# Patient Record
Sex: FEMALE | Race: BLACK OR AFRICAN AMERICAN | NOT HISPANIC OR LATINO | URBAN - METROPOLITAN AREA
[De-identification: names, ages, dates, MRNs, and addresses within clinical notes are randomized per-mention and may not be internally consistent; named-entity substitution may affect disease eponyms.]

---

## 2020-06-03 ENCOUNTER — OFFICE VISIT (OUTPATIENT)
Dept: URBAN - METROPOLITAN AREA SURGERY CENTER 20 | Facility: SURGERY CENTER | Age: 40
End: 2020-06-03

## 2020-06-04 ENCOUNTER — OFFICE VISIT (OUTPATIENT)
Dept: URBAN - METROPOLITAN AREA SURGERY CENTER 16 | Facility: SURGERY CENTER | Age: 40
End: 2020-06-04

## 2020-06-17 ENCOUNTER — OFFICE VISIT (OUTPATIENT)
Dept: URBAN - METROPOLITAN AREA SURGERY CENTER 20 | Facility: SURGERY CENTER | Age: 40
End: 2020-06-17
Payer: COMMERCIAL

## 2020-06-17 DIAGNOSIS — R19.5 ABNORMAL FECES: ICD-10-CM

## 2020-06-17 DIAGNOSIS — R10.31 ABDOMINAL DISCOMFORT IN RIGHT LOWER QUADRANT: ICD-10-CM

## 2020-06-17 PROCEDURE — G8907 PT DOC NO EVENTS ON DISCHARG: HCPCS | Performed by: INTERNAL MEDICINE

## 2020-06-17 PROCEDURE — 45378 DIAGNOSTIC COLONOSCOPY: CPT | Performed by: INTERNAL MEDICINE

## 2020-06-17 PROCEDURE — G9937 DIG OR SURV COLSCO: HCPCS | Performed by: INTERNAL MEDICINE

## 2020-06-17 RX ORDER — METHIMAZOLE 5 MG/1
TAKE 1 TABLET (5 MG) BY ORAL ROUTE ONCE DAILY TABLET ORAL 1
Qty: 0 | Refills: 0 | COMMUNITY
Start: 1900-01-01

## 2020-06-17 RX ORDER — FLUTICASONE PROPIONATE 50 UG/1
SPRAY, METERED NASAL
Qty: 0 | Refills: 0 | COMMUNITY
Start: 1900-01-01

## 2020-06-17 RX ORDER — OMEPRAZOLE 20 MG/1
TAKE 1 CAPSULE BY ORAL ROUTE DAILY CAPSULE, DELAYED RELEASE ORAL 1
Qty: 60 | Refills: 1 | COMMUNITY
Start: 2020-04-22 | End: 2020-08-20

## 2020-07-30 ENCOUNTER — OFFICE VISIT (OUTPATIENT)
Dept: URBAN - METROPOLITAN AREA SURGERY CENTER 16 | Facility: SURGERY CENTER | Age: 40
End: 2020-07-30

## 2021-01-11 ENCOUNTER — OFFICE VISIT (OUTPATIENT)
Dept: URBAN - METROPOLITAN AREA CLINIC 25 | Facility: CLINIC | Age: 41
End: 2021-01-11

## 2021-09-24 ENCOUNTER — OFFICE VISIT (OUTPATIENT)
Dept: URBAN - METROPOLITAN AREA TELEHEALTH 2 | Facility: TELEHEALTH | Age: 41
End: 2021-09-24
Payer: COMMERCIAL

## 2021-09-24 VITALS — BODY MASS INDEX: 25.58 KG/M2 | HEIGHT: 62 IN | WEIGHT: 139 LBS

## 2021-09-24 DIAGNOSIS — Z90.710 HISTORY OF HYSTERECTOMY: ICD-10-CM

## 2021-09-24 DIAGNOSIS — R14.0 ABDOMINAL BLOATING: ICD-10-CM

## 2021-09-24 DIAGNOSIS — R12 HEARTBURN: ICD-10-CM

## 2021-09-24 DIAGNOSIS — E05.90 HYPERTHYROIDISM: ICD-10-CM

## 2021-09-24 PROCEDURE — 99442 PHONE E/M BY PHYS 11-20 MIN: CPT | Performed by: INTERNAL MEDICINE

## 2021-09-24 RX ORDER — METHIMAZOLE 5 MG/1
TAKE 1 TABLET (5 MG) BY ORAL ROUTE ONCE DAILY TABLET ORAL 1
Qty: 0 | Refills: 0 | COMMUNITY
Start: 1900-01-01

## 2021-09-24 RX ORDER — FLUTICASONE PROPIONATE 50 UG/1
SPRAY, METERED NASAL
Qty: 0 | Refills: 0 | COMMUNITY
Start: 1900-01-01

## 2021-10-19 PROBLEM — 34486009: Status: ACTIVE | Noted: 2021-09-24

## 2021-10-22 ENCOUNTER — OFFICE VISIT (OUTPATIENT)
Dept: URBAN - METROPOLITAN AREA SURGERY CENTER 20 | Facility: SURGERY CENTER | Age: 41
End: 2021-10-22

## 2021-12-01 ENCOUNTER — OFFICE VISIT (OUTPATIENT)
Dept: URBAN - METROPOLITAN AREA SURGERY CENTER 20 | Facility: SURGERY CENTER | Age: 41
End: 2021-12-01

## 2021-12-30 ENCOUNTER — OFFICE VISIT (OUTPATIENT)
Dept: URBAN - METROPOLITAN AREA SURGERY CENTER 20 | Facility: SURGERY CENTER | Age: 41
End: 2021-12-30

## 2022-01-14 ENCOUNTER — OFFICE VISIT (OUTPATIENT)
Dept: URBAN - METROPOLITAN AREA SURGERY CENTER 20 | Facility: SURGERY CENTER | Age: 42
End: 2022-01-14

## 2022-04-08 ENCOUNTER — CLAIMS CREATED FROM THE CLAIM WINDOW (OUTPATIENT)
Dept: URBAN - METROPOLITAN AREA CLINIC 4 | Facility: CLINIC | Age: 42
End: 2022-04-08
Payer: COMMERCIAL

## 2022-04-08 ENCOUNTER — OFFICE VISIT (OUTPATIENT)
Dept: URBAN - METROPOLITAN AREA SURGERY CENTER 20 | Facility: SURGERY CENTER | Age: 42
End: 2022-04-08
Payer: COMMERCIAL

## 2022-04-08 ENCOUNTER — WEB ENCOUNTER (OUTPATIENT)
Dept: URBAN - METROPOLITAN AREA SURGERY CENTER 20 | Facility: SURGERY CENTER | Age: 42
End: 2022-04-08

## 2022-04-08 DIAGNOSIS — K31.89 ACQUIRED DEFORMITY OF DUODENUM: ICD-10-CM

## 2022-04-08 DIAGNOSIS — K31.89 FOCAL FOVEOLAR HYPERPLASIA: ICD-10-CM

## 2022-04-08 DIAGNOSIS — R12 BURNING REFLUX: ICD-10-CM

## 2022-04-08 DIAGNOSIS — R14.0 ABDOMINAL BLOATING: ICD-10-CM

## 2022-04-08 PROCEDURE — G8907 PT DOC NO EVENTS ON DISCHARG: HCPCS | Performed by: INTERNAL MEDICINE

## 2022-04-08 PROCEDURE — 88312 SPECIAL STAINS GROUP 1: CPT | Performed by: PATHOLOGY

## 2022-04-08 PROCEDURE — 43239 EGD BIOPSY SINGLE/MULTIPLE: CPT | Performed by: INTERNAL MEDICINE

## 2022-04-08 PROCEDURE — 88305 TISSUE EXAM BY PATHOLOGIST: CPT | Performed by: PATHOLOGY

## 2022-04-08 RX ORDER — METHIMAZOLE 5 MG/1
TAKE 1 TABLET (5 MG) BY ORAL ROUTE ONCE DAILY TABLET ORAL 1
Qty: 0 | Refills: 0 | COMMUNITY
Start: 1900-01-01

## 2022-04-08 RX ORDER — FLUTICASONE PROPIONATE 50 UG/1
SPRAY, METERED NASAL
Qty: 0 | Refills: 0 | COMMUNITY
Start: 1900-01-01

## 2022-04-11 ENCOUNTER — TELEPHONE ENCOUNTER (OUTPATIENT)
Dept: URBAN - METROPOLITAN AREA CLINIC 92 | Facility: CLINIC | Age: 42
End: 2022-04-11

## 2022-04-11 RX ORDER — OMEPRAZOLE MAGNESIUM 20.6 MG/1
1 TABLET 30 MINUTES BEFORE MORNING MEAL TABLET, DELAYED RELEASE ORAL ONCE A DAY
Qty: 60 | Refills: 1 | OUTPATIENT
Start: 2022-04-11

## 2022-04-30 ENCOUNTER — TELEPHONE ENCOUNTER (OUTPATIENT)
Dept: URBAN - METROPOLITAN AREA CLINIC 121 | Facility: CLINIC | Age: 42
End: 2022-04-30

## 2022-05-01 ENCOUNTER — TELEPHONE ENCOUNTER (OUTPATIENT)
Dept: URBAN - METROPOLITAN AREA CLINIC 121 | Facility: CLINIC | Age: 42
End: 2022-05-01

## 2023-01-04 ENCOUNTER — TELEPHONE ENCOUNTER (OUTPATIENT)
Dept: URBAN - METROPOLITAN AREA CLINIC 84 | Facility: CLINIC | Age: 43
End: 2023-01-04

## 2023-01-23 ENCOUNTER — OFFICE VISIT (OUTPATIENT)
Dept: URBAN - METROPOLITAN AREA CLINIC 25 | Facility: CLINIC | Age: 43
End: 2023-01-23

## 2023-02-27 ENCOUNTER — OFFICE VISIT (OUTPATIENT)
Dept: URBAN - METROPOLITAN AREA CLINIC 25 | Facility: CLINIC | Age: 43
End: 2023-02-27
Payer: COMMERCIAL

## 2023-02-27 ENCOUNTER — WEB ENCOUNTER (OUTPATIENT)
Dept: URBAN - METROPOLITAN AREA CLINIC 25 | Facility: CLINIC | Age: 43
End: 2023-02-27

## 2023-02-27 VITALS
DIASTOLIC BLOOD PRESSURE: 66 MMHG | HEIGHT: 62 IN | BODY MASS INDEX: 27.6 KG/M2 | HEART RATE: 81 BPM | WEIGHT: 150 LBS | TEMPERATURE: 98.1 F | SYSTOLIC BLOOD PRESSURE: 120 MMHG

## 2023-02-27 DIAGNOSIS — Z90.710 HISTORY OF HYSTERECTOMY: ICD-10-CM

## 2023-02-27 DIAGNOSIS — R19.4 CHANGE IN BOWEL HABITS: ICD-10-CM

## 2023-02-27 DIAGNOSIS — K59.00 CONSTIPATION, UNSPECIFIED CONSTIPATION TYPE: ICD-10-CM

## 2023-02-27 DIAGNOSIS — K92.1 HEMATOCHEZIA: ICD-10-CM

## 2023-02-27 DIAGNOSIS — R10.13 EPIGASTRIC PAIN: ICD-10-CM

## 2023-02-27 DIAGNOSIS — K21.9 GASTROESOPHAGEAL REFLUX DISEASE WITHOUT ESOPHAGITIS: ICD-10-CM

## 2023-02-27 PROBLEM — 266435005: Status: ACTIVE | Noted: 2023-02-27

## 2023-02-27 PROBLEM — 161800001: Status: ACTIVE | Noted: 2021-09-24

## 2023-02-27 PROCEDURE — 99214 OFFICE O/P EST MOD 30 MIN: CPT | Performed by: INTERNAL MEDICINE

## 2023-02-27 RX ORDER — OMEPRAZOLE MAGNESIUM 20.6 MG/1
1 TABLET 30 MINUTES BEFORE MORNING MEAL TABLET, DELAYED RELEASE ORAL ONCE A DAY
Qty: 60 | Refills: 1 | Status: ACTIVE | COMMUNITY
Start: 2022-04-11

## 2023-02-27 RX ORDER — FLUTICASONE PROPIONATE 50 UG/1
SPRAY, METERED NASAL
Qty: 0 | Refills: 0 | COMMUNITY
Start: 1900-01-01

## 2023-02-27 RX ORDER — METHIMAZOLE 5 MG/1
TAKE 1 TABLET (5 MG) BY ORAL ROUTE ONCE DAILY TABLET ORAL 1
Qty: 0 | Refills: 0 | COMMUNITY
Start: 1900-01-01

## 2023-03-10 PROBLEM — 14760008: Status: ACTIVE | Noted: 2023-02-27

## 2023-03-15 ENCOUNTER — TELEPHONE ENCOUNTER (OUTPATIENT)
Dept: URBAN - METROPOLITAN AREA CLINIC 35 | Facility: CLINIC | Age: 43
End: 2023-03-15

## 2023-03-15 ENCOUNTER — OFFICE VISIT (OUTPATIENT)
Dept: URBAN - METROPOLITAN AREA SURGERY CENTER 20 | Facility: SURGERY CENTER | Age: 43
End: 2023-03-15
Payer: COMMERCIAL

## 2023-03-15 DIAGNOSIS — K92.1 ACUTE MELENA: ICD-10-CM

## 2023-03-15 DIAGNOSIS — K59.09 CHANGE IN BOWEL MOVEMENTS INTERMITTENT CONSTIPATION. URGENCY IN THE MORNING.: ICD-10-CM

## 2023-03-15 PROCEDURE — G8907 PT DOC NO EVENTS ON DISCHARG: HCPCS | Performed by: INTERNAL MEDICINE

## 2023-03-15 PROCEDURE — 45378 DIAGNOSTIC COLONOSCOPY: CPT | Performed by: INTERNAL MEDICINE

## 2023-03-15 RX ORDER — OMEPRAZOLE MAGNESIUM 20.6 MG/1
1 TABLET 30 MINUTES BEFORE MORNING MEAL TABLET, DELAYED RELEASE ORAL ONCE A DAY
Qty: 60 | Refills: 1 | Status: ACTIVE | COMMUNITY
Start: 2022-04-11

## 2023-03-15 RX ORDER — METHIMAZOLE 5 MG/1
TAKE 1 TABLET (5 MG) BY ORAL ROUTE ONCE DAILY TABLET ORAL 1
Qty: 0 | Refills: 0 | COMMUNITY
Start: 1900-01-01

## 2023-03-15 RX ORDER — FLUTICASONE PROPIONATE 50 UG/1
SPRAY, METERED NASAL
Qty: 0 | Refills: 0 | COMMUNITY
Start: 1900-01-01

## 2023-03-15 RX ORDER — HYDROCORTISONE ACETATE 25 MG/1
1 SUPPOSITORY SUPPOSITORY RECTAL ONCE A DAY
Qty: 28 | Refills: 1 | OUTPATIENT
Start: 2023-03-15 | End: 2023-04-12

## 2023-03-22 ENCOUNTER — TELEPHONE ENCOUNTER (OUTPATIENT)
Dept: URBAN - METROPOLITAN AREA CLINIC 25 | Facility: CLINIC | Age: 43
End: 2023-03-22

## 2024-02-19 ENCOUNTER — LAB (OUTPATIENT)
Dept: URBAN - METROPOLITAN AREA CLINIC 25 | Facility: CLINIC | Age: 44
End: 2024-02-19

## 2024-02-19 ENCOUNTER — OV EP (OUTPATIENT)
Dept: URBAN - METROPOLITAN AREA CLINIC 25 | Facility: CLINIC | Age: 44
End: 2024-02-19
Payer: COMMERCIAL

## 2024-02-19 VITALS
WEIGHT: 147.4 LBS | BODY MASS INDEX: 27.12 KG/M2 | DIASTOLIC BLOOD PRESSURE: 78 MMHG | TEMPERATURE: 97.3 F | HEIGHT: 62 IN | HEART RATE: 91 BPM | SYSTOLIC BLOOD PRESSURE: 114 MMHG

## 2024-02-19 DIAGNOSIS — K59.01 CONSTIPATION: ICD-10-CM

## 2024-02-19 DIAGNOSIS — K29.50 CHRONIC GASTRITIS WITHOUT BLEEDING: ICD-10-CM

## 2024-02-19 DIAGNOSIS — R14.2 ERUCTATION: ICD-10-CM

## 2024-02-19 DIAGNOSIS — R12 HEARTBURN: ICD-10-CM

## 2024-02-19 PROBLEM — 8493009: Status: ACTIVE | Noted: 2024-02-19

## 2024-02-19 PROCEDURE — 99214 OFFICE O/P EST MOD 30 MIN: CPT | Performed by: INTERNAL MEDICINE

## 2024-02-19 RX ORDER — OMEPRAZOLE MAGNESIUM 20.6 MG/1
1 TABLET 30 MINUTES BEFORE MORNING MEAL TABLET, DELAYED RELEASE ORAL ONCE A DAY
Qty: 60 | Refills: 1 | Status: ACTIVE | COMMUNITY
Start: 2022-04-11

## 2024-02-19 RX ORDER — FLUTICASONE PROPIONATE 50 UG/1
SPRAY, METERED NASAL
Qty: 0 | Refills: 0 | COMMUNITY
Start: 1900-01-01

## 2024-02-19 RX ORDER — METHIMAZOLE 5 MG/1
TAKE 1 TABLET (5 MG) BY ORAL ROUTE ONCE DAILY TABLET ORAL 1
Qty: 0 | Refills: 0 | COMMUNITY
Start: 1900-01-01

## 2024-02-19 RX ORDER — DICYCLOMINE HYDROCHLORIDE 20 MG/1
TABLET ORAL
Qty: 20 TABLET | Status: ACTIVE | COMMUNITY

## 2024-02-19 RX ORDER — HYDROCODONE BITARTRATE AND ACETAMINOPHEN 5; 325 MG/1; MG/1
TABLET ORAL
Qty: 12 TABLET | Status: ACTIVE | COMMUNITY

## 2024-02-19 RX ORDER — ALUMINUM ZIRCONIUM TRICHLOROHYDREX GLY 0.19 G/G
1 TABLET 30 MINUTES BEFORE MORNING MEAL STICK TOPICAL ONCE A DAY
Qty: 60 | Refills: 1 | OUTPATIENT
Start: 2024-02-19

## 2024-02-19 RX ORDER — PROPRANOLOL HYDROCHLORIDE 10 MG/1
TAKE 1 TABLET BY MOUTH TWICE DAILY TABLET ORAL
Qty: 180 EACH | Refills: 0 | Status: ACTIVE | COMMUNITY

## 2024-02-19 RX ORDER — ERGOCALCIFEROL CAPSULES, 1.25 MG/1
TAKE 1 CAPSULE BY MOUTH WEEKLY CAPSULE ORAL
Qty: 12 EACH | Refills: 0 | Status: ACTIVE | COMMUNITY

## 2024-03-01 ENCOUNTER — EGD (OUTPATIENT)
Dept: URBAN - METROPOLITAN AREA SURGERY CENTER 20 | Facility: SURGERY CENTER | Age: 44
End: 2024-03-01
Payer: COMMERCIAL

## 2024-03-01 DIAGNOSIS — R14.0 ABDOMINAL BLOATING: ICD-10-CM

## 2024-03-01 DIAGNOSIS — R12 BURNING REFLUX: ICD-10-CM

## 2024-03-01 DIAGNOSIS — K29.30 CHRONIC SUPERFICIAL GASTRITIS: ICD-10-CM

## 2024-03-01 DIAGNOSIS — R14.2 BELCHING: ICD-10-CM

## 2024-03-01 PROCEDURE — 43239 EGD BIOPSY SINGLE/MULTIPLE: CPT | Performed by: INTERNAL MEDICINE

## 2024-03-01 RX ORDER — ERGOCALCIFEROL CAPSULES, 1.25 MG/1
TAKE 1 CAPSULE BY MOUTH WEEKLY CAPSULE ORAL
Qty: 12 EACH | Refills: 0 | Status: ACTIVE | COMMUNITY

## 2024-03-01 RX ORDER — PROPRANOLOL HYDROCHLORIDE 10 MG/1
TAKE 1 TABLET BY MOUTH TWICE DAILY TABLET ORAL
Qty: 180 EACH | Refills: 0 | Status: ACTIVE | COMMUNITY

## 2024-03-01 RX ORDER — DICYCLOMINE HYDROCHLORIDE 20 MG/1
TABLET ORAL
Qty: 20 TABLET | Status: ACTIVE | COMMUNITY

## 2024-03-01 RX ORDER — FLUTICASONE PROPIONATE 50 UG/1
SPRAY, METERED NASAL
Qty: 0 | Refills: 0 | COMMUNITY
Start: 1900-01-01

## 2024-03-01 RX ORDER — ALUMINUM ZIRCONIUM TRICHLOROHYDREX GLY 0.19 G/G
1 TABLET 30 MINUTES BEFORE MORNING MEAL STICK TOPICAL ONCE A DAY
Qty: 60 | Refills: 1 | Status: ACTIVE | COMMUNITY
Start: 2024-02-19

## 2024-03-01 RX ORDER — METHIMAZOLE 5 MG/1
TAKE 1 TABLET (5 MG) BY ORAL ROUTE ONCE DAILY TABLET ORAL 1
Qty: 0 | Refills: 0 | COMMUNITY
Start: 1900-01-01

## 2024-03-01 RX ORDER — HYDROCODONE BITARTRATE AND ACETAMINOPHEN 5; 325 MG/1; MG/1
TABLET ORAL
Qty: 12 TABLET | Status: ACTIVE | COMMUNITY

## 2024-03-01 RX ORDER — OMEPRAZOLE MAGNESIUM 20.6 MG/1
1 TABLET 30 MINUTES BEFORE MORNING MEAL TABLET, DELAYED RELEASE ORAL ONCE A DAY
Qty: 60 | Refills: 1 | Status: ACTIVE | COMMUNITY
Start: 2022-04-11

## 2024-03-28 ENCOUNTER — OV EP (OUTPATIENT)
Dept: URBAN - METROPOLITAN AREA CLINIC 25 | Facility: CLINIC | Age: 44
End: 2024-03-28

## 2024-04-04 ENCOUNTER — OV EP (OUTPATIENT)
Dept: URBAN - METROPOLITAN AREA CLINIC 25 | Facility: CLINIC | Age: 44
End: 2024-04-04
Payer: COMMERCIAL

## 2024-04-04 VITALS
WEIGHT: 150.2 LBS | SYSTOLIC BLOOD PRESSURE: 123 MMHG | HEART RATE: 75 BPM | TEMPERATURE: 97.7 F | DIASTOLIC BLOOD PRESSURE: 87 MMHG | HEIGHT: 62 IN | BODY MASS INDEX: 27.64 KG/M2

## 2024-04-04 DIAGNOSIS — K59.04 CHRONIC IDIOPATHIC CONSTIPATION: ICD-10-CM

## 2024-04-04 DIAGNOSIS — K29.60 REFLUX GASTRITIS: ICD-10-CM

## 2024-04-04 PROBLEM — 82934008: Status: ACTIVE | Noted: 2024-04-04

## 2024-04-04 PROBLEM — 72950008: Status: ACTIVE | Noted: 2024-04-04

## 2024-04-04 PROCEDURE — 99214 OFFICE O/P EST MOD 30 MIN: CPT

## 2024-04-04 RX ORDER — ALUMINUM ZIRCONIUM TRICHLOROHYDREX GLY 0.19 G/G
1 TABLET 30 MINUTES BEFORE MORNING MEAL STICK TOPICAL ONCE A DAY
Qty: 60 | Refills: 1 | Status: ACTIVE | COMMUNITY
Start: 2024-02-19

## 2024-04-04 RX ORDER — OMEPRAZOLE MAGNESIUM 20.6 MG/1
1 TABLET 30 MINUTES BEFORE MORNING MEAL TABLET, DELAYED RELEASE ORAL ONCE A DAY
Qty: 60 | Refills: 1 | Status: DISCONTINUED | COMMUNITY
Start: 2022-04-11

## 2024-04-04 RX ORDER — LINACLOTIDE 145 UG/1
1 CAPSULE AT LEAST 30 MINUTES BEFORE THE FIRST MEAL OF THE DAY ON AN EMPTY STOMACH CAPSULE, GELATIN COATED ORAL ONCE A DAY
Qty: 90 | Refills: 1 | OUTPATIENT
Start: 2024-04-04 | End: 2024-10-01

## 2024-04-04 RX ORDER — FLUTICASONE PROPIONATE 50 UG/1
SPRAY, METERED NASAL
Qty: 0 | Refills: 0 | Status: DISCONTINUED | COMMUNITY
Start: 1900-01-01

## 2024-04-04 RX ORDER — ERGOCALCIFEROL CAPSULES, 1.25 MG/1
TAKE 1 CAPSULE BY MOUTH WEEKLY CAPSULE ORAL
Qty: 12 EACH | Refills: 0 | Status: ACTIVE | COMMUNITY

## 2024-04-04 RX ORDER — DICYCLOMINE HYDROCHLORIDE 20 MG/1
TABLET ORAL
Qty: 20 TABLET | Status: DISCONTINUED | COMMUNITY

## 2024-04-04 RX ORDER — OMEPRAZOLE 40 MG/1
1 CAPSULE 30 MINUTES BEFORE MORNING MEAL CAPSULE, DELAYED RELEASE ORAL ONCE A DAY
Qty: 30 | Refills: 1 | OUTPATIENT
Start: 2024-04-04

## 2024-04-04 RX ORDER — HYDROCODONE BITARTRATE AND ACETAMINOPHEN 5; 325 MG/1; MG/1
TABLET ORAL
Qty: 12 TABLET | Status: DISCONTINUED | COMMUNITY

## 2024-04-04 RX ORDER — PROPRANOLOL HYDROCHLORIDE 10 MG/1
TAKE 1 TABLET BY MOUTH TWICE DAILY TABLET ORAL
Qty: 180 EACH | Refills: 0 | Status: ACTIVE | COMMUNITY

## 2024-04-04 RX ORDER — METHIMAZOLE 5 MG/1
TAKE 1 TABLET (5 MG) BY ORAL ROUTE ONCE DAILY TABLET ORAL 1
Qty: 0 | Refills: 0 | Status: DISCONTINUED | COMMUNITY
Start: 1900-01-01

## 2024-04-04 NOTE — HPI-OTHER HISTORIES
02/24 OV  The patient reports a one month history of worsening heartburn that occurs on daily basis that is worse at night.  She reports that improves heartburn with acid regurgitation.  She reports abdominal bloating and excessive eructation associated with the heartburn.  She denies nausea/vomiting, excessive flatulence, dysphagia, odynophagia, anorexia, early satiety, chest pain, shortness of breath, jaundice, tea-colored urine, generalized pruritis.  She reports peristent constipation with straining with bowel movements.  She report that constipation is improved with increasing fiber in her diet and increasing her fluid intake.  She denies diarrhea, sensation of incomplete evacuation, fecal incontinence, hematochezia, melena, hematemesis, rectal bleeding, rectal pain or tenesmus.

## 2024-04-04 NOTE — HPI-TODAY'S VISIT:
04/24 OV  S/p EGD/Colon, heartburn better on Prilosec 20mg, EGD revealed gastritis, notes mild relief, but continues to experience increased eructation, the patient has started a vitamin fiber. Deneis dysphagia, odynophagia, unintentional weight loss, hoarseness.  The patient's main issue now is bloating and gas discomfort,. continues to have mild constipation intermittently, will have hard stools and then loose stools. No laxatives, no miralax, only fiber. Notes with fiber she has been having increased bloating and gas, gas discomfort gets better w/ a BM. Denies BRBPR< melena.     EGD 2024  - Normal esophagus.Impression:- Z-line regular, 40 cm from the incisors.- Chronic gastritis. Biopsied.- Normal examined duodenum.- The examination was otherwise normal.  Colon 2024  - The examined portion of the ileum was normal.- Non-bleeding internal hemorrhoids.- The examination was otherwise normal on direct and retroflexion views.- No specimens collected.

## 2024-07-10 ENCOUNTER — OFFICE VISIT (OUTPATIENT)
Dept: URBAN - METROPOLITAN AREA CLINIC 25 | Facility: CLINIC | Age: 44
End: 2024-07-10

## 2024-07-10 ENCOUNTER — DASHBOARD ENCOUNTERS (OUTPATIENT)
Age: 44
End: 2024-07-10

## 2024-07-10 RX ORDER — ERGOCALCIFEROL CAPSULES, 1.25 MG/1
TAKE 1 CAPSULE BY MOUTH WEEKLY CAPSULE ORAL
Qty: 12 EACH | Refills: 0 | Status: ACTIVE | COMMUNITY

## 2024-07-10 RX ORDER — OMEPRAZOLE 40 MG/1
1 CAPSULE 30 MINUTES BEFORE MORNING MEAL CAPSULE, DELAYED RELEASE ORAL ONCE A DAY
Qty: 30 | Refills: 1 | Status: ACTIVE | COMMUNITY
Start: 2024-04-04

## 2024-07-10 RX ORDER — LINACLOTIDE 145 UG/1
1 CAPSULE AT LEAST 30 MINUTES BEFORE THE FIRST MEAL OF THE DAY ON AN EMPTY STOMACH CAPSULE, GELATIN COATED ORAL ONCE A DAY
Qty: 90 | Refills: 1 | Status: ACTIVE | COMMUNITY
Start: 2024-04-04 | End: 2024-10-01

## 2024-07-10 RX ORDER — PROPRANOLOL HYDROCHLORIDE 10 MG/1
TAKE 1 TABLET BY MOUTH TWICE DAILY TABLET ORAL
Qty: 180 EACH | Refills: 0 | Status: ACTIVE | COMMUNITY

## 2024-07-10 RX ORDER — ALUMINUM ZIRCONIUM TRICHLOROHYDREX GLY 0.19 G/G
1 TABLET 30 MINUTES BEFORE MORNING MEAL STICK TOPICAL ONCE A DAY
Qty: 60 | Refills: 1 | Status: ACTIVE | COMMUNITY
Start: 2024-02-19

## 2024-07-10 NOTE — HPI-OTHER HISTORIES
04/24 OV  S/p EGD/Colon, heartburn better on Prilosec 20mg, EGD revealed gastritis, notes mild relief, but continues to experience increased eructation, the patient has started a vitamin fiber. Deneis dysphagia, odynophagia, unintentional weight loss, hoarseness.  The patient's main issue now is bloating and gas discomfort,. continues to have mild constipation intermittently, will have hard stools and then loose stools. No laxatives, no miralax, only fiber. Notes with fiber she has been having increased bloating and gas, gas discomfort gets better w/ a BM. Denies BRBPR< melena.     EGD 2024  - Normal esophagus.Impression:- Z-line regular, 40 cm from the incisors.- Chronic gastritis. Biopsied.- Normal examined duodenum.- The examination was otherwise normal.  Colon 2024  - The examined portion of the ileum was normal.- Non-bleeding internal hemorrhoids.- The examination was otherwise normal on direct and retroflexion views.- No specimens collected.  02/24 OV  The patient reports a one month history of worsening heartburn that occurs on daily basis that is worse at night.  She reports that improves heartburn with acid regurgitation.  She reports abdominal bloating and excessive eructation associated with the heartburn.  She denies nausea/vomiting, excessive flatulence, dysphagia, odynophagia, anorexia, early satiety, chest pain, shortness of breath, jaundice, tea-colored urine, generalized pruritis.  She reports peristent constipation with straining with bowel movements.  She report that constipation is improved with increasing fiber in her diet and increasing her fluid intake.  She denies diarrhea, sensation of incomplete evacuation, fecal incontinence, hematochezia, melena, hematemesis, rectal bleeding, rectal pain or tenesmus.